# Patient Record
Sex: FEMALE | Race: BLACK OR AFRICAN AMERICAN
[De-identification: names, ages, dates, MRNs, and addresses within clinical notes are randomized per-mention and may not be internally consistent; named-entity substitution may affect disease eponyms.]

---

## 2017-05-10 NOTE — ER DOCUMENT REPORT
ED General





- General


Chief Complaint: Groin Pain


Stated Complaint: GROIN PAIN,LEFT SIDE TINGLING


Time Seen by Provider: 05/10/17 06:11


Mode of Arrival: Ambulatory


Information source: Patient


Notes: 


31-year-old female history of left groin pain 3 weeks ago which resolved on its 

own presents with complaints of continued left groin pain from last night.  

Patient denies any injuries states she awoke with this pain.  Denies any fevers 

or chills nausea vomiting or diarrhea.  Patient denies any urinary complaints 

or pelvic pain.  Patient notes pain worsens with ambulation and bending of her 

hip


TRAVEL OUTSIDE OF THE [place] IN LAST 30 DAYS: No





- HPI


Onset: Yesterday


Onset/Duration: Sudden


Quality of pain: Achy


Severity: Mild


Pain Level: 1


Associated symptoms: Body/muscle aches


Exacerbated by: Walking


Relieved by: Denies


Similar symptoms previously: Yes


Recently seen / treated by doctor: Yes





- Related Data


Allergies/Adverse Reactions: 


 





No Known Allergies Allergy (Verified 05/10/17 06:13)


 











Past Medical History





- Social History


Smoking Status: Never Smoker


Cigarette use (# per day): No


Chew tobacco use (# tins/day): No


Smoking Education Provided: No


Family History: Arthritis, CAD, DM, Hyperlipidemia, Hypertension, Malignancy, 

Thyroid Disfunction


Patient has suicidal ideation: No


Patient has homicidal ideation: No


Pulmonary Medical History: Reports: Hx Bronchitis


Neurological Medical History: Reports: Hx Migraine


Renal/ Medical History: Denies: Hx Peritoneal Dialysis


Musculoskeltal Medical History: Reports Hx Musculoskeletal Trauma


Psychiatric Medical History: Reports: Hx Attention Deficit Hyperactivity 

Disorder - add, Hx Depression


Traumatic Medical History: Reports: Hx Fractures - right wrist


Past Surgical History: Reports: Hx Gynecologic Surgery - leep, Hx Oral Surgery 

- wisdom teeth, Hx Tonsillectomy, Hx Tubal Ligation





- Immunizations


Immunizations up to date: No


Hx Diphtheria, Pertussis, Tetanus Vaccination: No





Review of Systems





- Review of Systems


Notes: 


REVIEW OF SYSTEMS:


CONSTITUTIONAL :  Denies fever,  chills, or sweats.  Denies recent illness.


EENT:   Denies eye, ear, throat, or mouth pain or symptoms.  Denies nasal or 

sinus congestion or discharge.  Denies throat, tongue, or mouth swelling or 

difficulty swallowing.


CARDIOVASCULAR:  Denies chest pain.  Denies palpitations or racing or irregular 

heart beat.  Denies ankle edema.


RESPIRATORY:  Denies cough, cold, or chest congestion.  Denies shortness of 

breath, difficulty breathing, or wheezing.


GASTROINTESTINAL:  Denies abdominal pain or distention.  Denies nausea, vomiting

, or diarrhea.  Denies blood in vomitus, stools, or per rectum.  Denies black, 

tarry stools.  Denies constipation. 


GENITOURINARY:  Denies difficulty urinating, painful urination, burning, 

frequency, blood in urine, or discharge.


FEMALE  GENITOURINARY:  Denies vaginal bleeding, heavy or abnormal periods, 

irregular periods.  Denies vaginal discharge or odor. 


MUSCULOSKELETAL: Left groin pain


SKIN:   Denies rash, lesions or sores.


HEMATOLOGIC :   Denies easy bruising or bleeding.


LYMPHATIC:  Denies swollen, enlarged glands.


NEUROLOGICAL:  Denies confusion or altered mental status.  Denies passing out 

or loss of consciousness.  Denies dizziness or lightheadedness.  Denies 

headache.  Denies weakness or paralysis or loss of use of either side.  Denies 

problems with gait or speech.  Denies sensory loss, numbness, or tingling.  

Denies seizures.


PSYCHIATRIC:  Denies anxiety or stress.  Denies depression, suicidal ideation, 

or homicidal ideation.





ALL OTHER SYSTEMS REVIEWED AND NEGATIVE.








Dictation was performed using Dragon voice recognition software 











PHYSICAL EXAMINATION:





GENERAL: Well-appearing, well-nourished and in no acute distress.





HEAD: Atraumatic, normocephalic.





EYES: Pupils equal round and reactive to light, extraocular movements intact, 

conjunctiva are normal.





ENT: Nares patent, oropharynx clear without exudates.  Moist mucous membranes.





NECK: Normal range of motion, supple without lymphadenopathy





LUNGS: Breath sounds clear to auscultation bilaterally and equal.  No wheezes 

rales or rhonchi.





HEART: Regular rate and rhythm without murmurs





ABDOMEN: Soft, nontender, nondistended abdomen.  No guarding, no rebound.  No 

masses appreciated.





Female : deferred





Musculoskeletal: Pain with range of motion of the left hip tender of the inner 

thigh with no inguinal involvement no adenopathy no bony tenderness





NEUROLOGICAL: Cranial nerves grossly intact.  Normal speech, normal gait.  

Normal sensory, motor exams





PSYCH: Normal mood, normal affect.





SKIN: Warm, Dry, normal turgor, no rashes or lesions noted.





Physical Exam





- Vital signs


Vitals: 





 











Temp Pulse Resp BP Pulse Ox


 


 98.7 F   101 H  18   133/93 H  100 


 


 05/10/17 06:14  05/10/17 06:14  05/10/17 06:14  05/10/17 06:14  05/10/17 06:14














Course





- Re-evaluation


Re-evalutation: 


05/10/17 06:48


Patient instructed on risks and benefits of medications prescribed. Denies any 

concerns regarding such. 





Patient will be treated with prednisone Valium and naproxen. 





Otherwise appears to be a groin strain in this well-appearing and stable for 

discharge








After performing a Medical Screening Examination, I estimate there is LOW risk 

for INTRACRANIAL HEMORRHAGE, UNSTABLE SPINE FRACTURE, CENTRAL CORD SYNDROME, 

CAUDA EQUINA, THORACIC AORTIC DISSECTION, PNEUMOTHORAX, PERFORATED BOWEL, 

RUPTURED ABDOMINAL AORTIC ANEURYSM, ACUTE TENDON RUPTURE, COMPARTMENT SYNDROME, 

or OPEN FRACTURE, thus I consider the discharge disposition reasonable. Also, 

there is no evidence or peritonitis, sepsis, or toxicity.  I have reevaluated 

this patient multiple times and no significant life threatening changes are 

noted. The patient and I have discussed the diagnosis and risks, and we agree 

with discharging home to follow-up with their primary doctor with the 

understanding that symptoms and presentations can change. We also discussed 

returning to the Emergency Department immediately if new or worsening symptoms 

occur. We have discussed the symptoms which are most concerning (e.g., bloody 

stool, fever, changing or worsening pain, vomiting) that necessitate immediate 

return.





05/10/17 06:49








- Vital Signs


Vital signs: 





 











Temp Pulse Resp BP Pulse Ox


 


 98.7 F   101 H  18   133/93 H  100 


 


 05/10/17 06:14  05/10/17 06:14  05/10/17 06:14  05/10/17 06:14  05/10/17 06:14














Discharge





- Discharge


Clinical Impression: 


Strain of left inguinal muscle


Qualifiers:


 Encounter type: initial encounter Qualified Code(s): S39.013A - Strain of 

muscle, fascia and tendon of pelvis, initial encounter





Condition: Stable


Disposition: HOME, SELF-CARE


Instructions:  Muscle Strain ([facility]), Myalagia (Muscle Pain) ([facility])


Additional Instructions: 





Please follow-up with the following clinic in 2-3 days or return immediately if 

there are any other concerns








[facility]





Medical [facility] in [place], [place]





Address: [address], [place] [address]





Phone: [phone]


Prescriptions: 


Diazepam [Valium 5 mg Tablet] 5 mg PO QIDP PRN #15 tablet


 PRN Reason: 


Naproxen 500 mg PO BID #30 tablet


Prednisone 60 mg PO DAILY #15 tablet
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
## 2018-04-20 ENCOUNTER — HOSPITAL ENCOUNTER (EMERGENCY)
Dept: HOSPITAL 62 - ER | Age: 33
Discharge: HOME | End: 2018-04-20
Payer: SELF-PAY

## 2018-04-20 VITALS — DIASTOLIC BLOOD PRESSURE: 88 MMHG | SYSTOLIC BLOOD PRESSURE: 148 MMHG

## 2018-04-20 DIAGNOSIS — M25.422: ICD-10-CM

## 2018-04-20 DIAGNOSIS — M25.522: Primary | ICD-10-CM

## 2018-04-20 DIAGNOSIS — F17.210: ICD-10-CM

## 2018-04-20 LAB
ADD MANUAL DIFF: NO
ALBUMIN SERPL-MCNC: 3.9 G/DL (ref 3.5–5)
ALP SERPL-CCNC: 107 U/L (ref 38–126)
ALT SERPL-CCNC: 25 U/L (ref 9–52)
ANION GAP SERPL CALC-SCNC: 12 MMOL/L (ref 5–19)
AST SERPL-CCNC: 16 U/L (ref 14–36)
BASOPHILS # BLD AUTO: 0 10^3/UL (ref 0–0.2)
BASOPHILS NFR BLD AUTO: 0.4 % (ref 0–2)
BILIRUB DIRECT SERPL-MCNC: 0.1 MG/DL (ref 0–0.4)
BILIRUB SERPL-MCNC: 0.1 MG/DL (ref 0.2–1.3)
BUN SERPL-MCNC: 17 MG/DL (ref 7–20)
CALCIUM: 9.5 MG/DL (ref 8.4–10.2)
CHLORIDE SERPL-SCNC: 106 MMOL/L (ref 98–107)
CO2 SERPL-SCNC: 24 MMOL/L (ref 22–30)
EOSINOPHIL # BLD AUTO: 0.1 10^3/UL (ref 0–0.6)
EOSINOPHIL NFR BLD AUTO: 0.9 % (ref 0–6)
ERYTHROCYTE [DISTWIDTH] IN BLOOD BY AUTOMATED COUNT: 13.1 % (ref 11.5–14)
ERYTHROCYTE [SEDIMENTATION RATE] IN BLOOD: 15 MM/HR (ref 0–20)
GLUCOSE SERPL-MCNC: 94 MG/DL (ref 75–110)
HCT VFR BLD CALC: 40.8 % (ref 36–47)
HGB BLD-MCNC: 14 G/DL (ref 12–15.5)
LYMPHOCYTES # BLD AUTO: 2.7 10^3/UL (ref 0.5–4.7)
LYMPHOCYTES NFR BLD AUTO: 29.7 % (ref 13–45)
MCH RBC QN AUTO: 29.8 PG (ref 27–33.4)
MCHC RBC AUTO-ENTMCNC: 34.2 G/DL (ref 32–36)
MCV RBC AUTO: 87 FL (ref 80–97)
MONOCYTES # BLD AUTO: 0.7 10^3/UL (ref 0.1–1.4)
MONOCYTES NFR BLD AUTO: 7.8 % (ref 3–13)
NEUTROPHILS # BLD AUTO: 5.6 10^3/UL (ref 1.7–8.2)
NEUTS SEG NFR BLD AUTO: 61.2 % (ref 42–78)
PLATELET # BLD: 270 10^3/UL (ref 150–450)
POTASSIUM SERPL-SCNC: 4.4 MMOL/L (ref 3.6–5)
PROT SERPL-MCNC: 7.3 G/DL (ref 6.3–8.2)
RBC # BLD AUTO: 4.68 10^6/UL (ref 3.72–5.28)
SODIUM SERPL-SCNC: 142.4 MMOL/L (ref 137–145)
TOTAL CELLS COUNTED % (AUTO): 100 %
WBC # BLD AUTO: 9.1 10^3/UL (ref 4–10.5)

## 2018-04-20 PROCEDURE — 85652 RBC SED RATE AUTOMATED: CPT

## 2018-04-20 PROCEDURE — 85025 COMPLETE CBC W/AUTO DIFF WBC: CPT

## 2018-04-20 PROCEDURE — 36415 COLL VENOUS BLD VENIPUNCTURE: CPT

## 2018-04-20 PROCEDURE — 99284 EMERGENCY DEPT VISIT MOD MDM: CPT

## 2018-04-20 PROCEDURE — 86140 C-REACTIVE PROTEIN: CPT

## 2018-04-20 PROCEDURE — 80053 COMPREHEN METABOLIC PANEL: CPT

## 2018-04-20 NOTE — RADIOLOGY REPORT (SQ)
EXAM DESCRIPTION:  ELBOW LEFT OVER 2 VIEWS



COMPLETED DATE/TIME:  4/20/2018 3:49 pm



REASON FOR STUDY:  pain and swelling



COMPARISON:  None.



NUMBER OF VIEWS:  Four view.



TECHNIQUE:  AP, lateral, and both oblique radiographic images acquired of the left elbow.



LIMITATIONS:  None.



FINDINGS:  MINERALIZATION: Normal.

BONES: No acute fracture or dislocation. No worrisome bone lesions. No significant osteophytes.

JOINT: No effusions.

SOFT TISSUES: No soft tissue swelling.  No foreign body.

OTHER: No other significant finding.



IMPRESSION:  NEGATIVE STUDY OF THE LEFT ELBOW.  NO EXPLANATION FOR PAIN.



TECHNICAL DOCUMENTATION:  JOB ID:  [id number]

 2011 [employer]- All Rights Reserved.



Reading location - IP/workstation name: [email or web address]
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
## 2019-08-20 LAB
ANION GAP SERPL CALC-SCNC: 8 MMOL/L (ref 5–19)
APPEARANCE UR: CLEAR
APTT PPP: YELLOW S
BILIRUB UR QL STRIP: NEGATIVE
BUN SERPL-MCNC: 11 MG/DL (ref 7–20)
CALCIUM: 9.2 MG/DL (ref 8.4–10.2)
CHLORIDE SERPL-SCNC: 103 MMOL/L (ref 98–107)
CO2 SERPL-SCNC: 27 MMOL/L (ref 22–30)
ERYTHROCYTE [DISTWIDTH] IN BLOOD BY AUTOMATED COUNT: 13.1 % (ref 11.5–14)
GLUCOSE SERPL-MCNC: 90 MG/DL (ref 75–110)
GLUCOSE UR STRIP-MCNC: NEGATIVE MG/DL
HCT VFR BLD CALC: 41.8 % (ref 36–47)
HGB BLD-MCNC: 14.4 G/DL (ref 12–15.5)
KETONES UR STRIP-MCNC: NEGATIVE MG/DL
MCH RBC QN AUTO: 29.9 PG (ref 27–33.4)
MCHC RBC AUTO-ENTMCNC: 34.4 G/DL (ref 32–36)
MCV RBC AUTO: 87 FL (ref 80–97)
NITRITE UR QL STRIP: NEGATIVE
PH UR STRIP: 6 [PH] (ref 5–9)
PLATELET # BLD: 266 10^3/UL (ref 150–450)
POTASSIUM SERPL-SCNC: 4.2 MMOL/L (ref 3.6–5)
PROT UR STRIP-MCNC: NEGATIVE MG/DL
RBC # BLD AUTO: 4.8 10^6/UL (ref 3.72–5.28)
SP GR UR STRIP: 1.02
UROBILINOGEN UR-MCNC: NEGATIVE MG/DL (ref ?–2)
WBC # BLD AUTO: 8.4 10^3/UL (ref 4–10.5)

## 2019-08-27 ENCOUNTER — HOSPITAL ENCOUNTER (OUTPATIENT)
Dept: HOSPITAL 62 - OROUT | Age: 34
Discharge: HOME | End: 2019-08-27
Attending: SPECIALIST
Payer: COMMERCIAL

## 2019-08-27 VITALS — SYSTOLIC BLOOD PRESSURE: 136 MMHG | DIASTOLIC BLOOD PRESSURE: 85 MMHG

## 2019-08-27 DIAGNOSIS — N92.0: ICD-10-CM

## 2019-08-27 DIAGNOSIS — N94.4: Primary | ICD-10-CM

## 2019-08-27 DIAGNOSIS — F17.210: ICD-10-CM

## 2019-08-27 PROCEDURE — 58563 HYSTEROSCOPY ABLATION: CPT

## 2019-08-27 PROCEDURE — 80048 BASIC METABOLIC PNL TOTAL CA: CPT

## 2019-08-27 PROCEDURE — 86900 BLOOD TYPING SEROLOGIC ABO: CPT

## 2019-08-27 PROCEDURE — 36415 COLL VENOUS BLD VENIPUNCTURE: CPT

## 2019-08-27 PROCEDURE — 85027 COMPLETE CBC AUTOMATED: CPT

## 2019-08-27 PROCEDURE — 86901 BLOOD TYPING SEROLOGIC RH(D): CPT

## 2019-08-27 PROCEDURE — 81001 URINALYSIS AUTO W/SCOPE: CPT

## 2019-08-27 PROCEDURE — 86850 RBC ANTIBODY SCREEN: CPT

## 2019-08-27 PROCEDURE — 81025 URINE PREGNANCY TEST: CPT

## 2019-08-27 RX ADMIN — FENTANYL CITRATE ONE MCG: 50 INJECTION INTRAMUSCULAR; INTRAVENOUS at 08:25

## 2019-08-27 RX ADMIN — OXYCODONE AND ACETAMINOPHEN PRN TAB: 5; 325 TABLET ORAL at 09:38

## 2019-08-27 RX ADMIN — OXYCODONE AND ACETAMINOPHEN PRN TAB: 5; 325 TABLET ORAL at 08:45

## 2019-08-27 RX ADMIN — FENTANYL CITRATE ONE MCG: 50 INJECTION INTRAMUSCULAR; INTRAVENOUS at 08:17
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
## 2019-08-27 NOTE — DISCHARGE SUMMARY
Discharge Summary (SDC)





- Discharge


Final Diagnosis: 





Menorrhagia


Date of Surgery: 08/27/19


Discharge Date: 08/27/19


Condition: Good


Treatment or Instructions: 


None


Referrals: 


DANIAL VELIZ MD [EMERITUS] -  (1 week)


Discharge Diet: As Tolerated


Discharge Activity: Activity As Tolerated


Home Care Assistance: None Needed


Report the Following to Your Physician Immediately: Shortness of Breath, Nausea,

 Vomiting, Increase in Pain, Signs of Hyperglycemia, Signs of Hypoglycemia, 

Yellow Skin, Fever over 101 Degrees, Unusual Bleeding, Redness, Swelling, 

Warmth, Increased Soreness, Drainage-Yellow, Drainage-Gray, Drainage-Green, 

Drainage-Foul Smelling, Increased Vaginal Bleed, Large Clots, Numbness, Tingling

 Sensation, Visual Disturbance, Weight Gain 2-3lbs a day, Weight Gain 3-5lbs a 

week, Wheezing, Seizure, IV Site Infection Signs, Urinary Infection Signs
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
## 2019-10-29 ENCOUNTER — HOSPITAL ENCOUNTER (EMERGENCY)
Dept: HOSPITAL 62 - ER | Age: 34
Discharge: HOME | End: 2019-10-29
Payer: COMMERCIAL

## 2019-10-29 VITALS — SYSTOLIC BLOOD PRESSURE: 145 MMHG | DIASTOLIC BLOOD PRESSURE: 94 MMHG

## 2019-10-29 DIAGNOSIS — F17.200: ICD-10-CM

## 2019-10-29 DIAGNOSIS — Z98.51: ICD-10-CM

## 2019-10-29 DIAGNOSIS — R51: Primary | ICD-10-CM

## 2019-10-29 PROCEDURE — 96372 THER/PROPH/DIAG INJ SC/IM: CPT

## 2019-10-29 PROCEDURE — 99283 EMERGENCY DEPT VISIT LOW MDM: CPT

## 2019-10-29 NOTE — ER DOCUMENT REPORT
HPI





- HPI


Time Seen by Provider: 10/29/19 11:21


Pain Level: 4


Notes: 





Patient is a 34-year-old female with a history of migraines who presents 

complaining of ongoing migraine for the past 3 days that is been resilient 

despite her home medications.  Patient states that this is not the worst 

headache of her life and did not start as a thunderclap.  She is able to eat and

drink without difficulty.  She is urinating normally.  She has had some light 

sensitivity associated.  Patient states that the headache is primarily to the 

frontal area which is where her normal headaches are located.  Denies any fever,

head injury, neck pain, changes in vision/speech/mentation/hearing, URI, sore 

throat, chest pain, palpitations, syncope, cough, shortness of breath, wheeze, 

dyspnea, abdominal pain, nausea/vomiting/diarrhea, urinary retention, dysuria, 

hematuria, loss of control of bowel or bladder, numbness/tingling, saddle 

anesthesia, muscle paralysis/weakness, or rash.





- ROS


Systems Reviewed and Negative: Yes All other systems reviewed and negative





- CONSTITUTIONAL


Constitutional: DENIES: Fever, Chills





- REPRODUCTIVE


Reproductive: DENIES: Pregnant:





Past Medical History





- General


Information source: Patient





- Social History


Smoking Status: Current Every Day Smoker


Frequency of alcohol use: Occasional


Drug Abuse: None


Family History: Arthritis, CAD, DM, Hyperlipidemia, Hypertension, Malignancy, 

Thyroid Disfunction


Patient has suicidal ideation: No


Patient has homicidal ideation: No





- Past Medical History


Cardiac Medical History: 


   Denies: Hx Coronary Artery Disease, Hx Heart Attack, Hx Hypertension


Pulmonary Medical History: 


   Denies: Hx Asthma, Hx Bronchitis, Hx COPD, Hx Pneumonia


Neurological Medical History: Reports: Hx Migraine.  Denies: Hx Cerebrovascular 

Accident, Hx Seizures


Renal/ Medical History: Denies: Hx Peritoneal Dialysis


Musculoskeletal Medical History: Denies Hx Arthritis, Reports Hx Musculoskeletal

Trauma


Psychiatric Medical History: Reports: Hx Attention Deficit Hyperactivity 

Disorder - add, Hx Depression


Traumatic Medical History: Reports: Hx Fractures - right wrist


Past Surgical History: Reports: Hx Gynecologic Surgery - leep, Hx Oral Surgery -

wisdom teeth, Hx Tonsillectomy, Hx Tubal Ligation





- Immunizations


Immunizations up to date: No


Hx Diphtheria, Pertussis, Tetanus Vaccination: No





Vertical Provider Document





- CONSTITUTIONAL


Agree With Documented VS: Yes


Notes: 





PHYSICAL EXAMINATION:  





GENERAL: Well-appearing, well-nourished and in no acute distress.  A&Ox4.  

Answers questions appropriately.





HEAD: Atraumatic, normocephalic.  Non-tender. 





EYES: Pupils equal round and reactive to light, extraocular movements intact, 

sclera anicteric, conjunctiva are normal.  No nystagmus.  





ENT: Nares patent and without discharge.  oropharynx clear without exudates.  No

tonsilar hypertrophy or erythema.  Moist mucous membranes.  





NECK: Normal range of motion, supple without lymphadenopathy.  No rigidity/me

ningismus.  No midline tenderness. 





LUNGS: Breath sounds clear to auscultation bilaterally and equal.  No wheezes 

rales or rhonchi.





HEART: Regular rate and rhythm without murmurs, rubs, gallops.





ABDOMEN: Soft, nontender, nondistended abdomen.  No guarding, no rebound.  

Normal bowel sounds present.  No CVA tenderness bilaterally.  





Musculoskeletal: Ext b/l:  FROM to passive/active. Strength 5+/5.  No deficits 

noted.  No bony tenderness of extremities.





Extremities:  No cyanosis, clubbing, or edema b/l.  Peripheral pulses 2+.  

Capillary refill less than 2 seconds.





NEUROLOGICAL: NIH 0.  GCS 15.  Cranial nerves grossly intact.  Normal speech, 

normal gait.  Normal sensory, motor exams.  Reflexes 2+ b/l. KADEN's negative.  

Pronator drift negative. Heel/shin, finger/nose wnl. Romberg neg.





PSYCH: Normal mood, normal affect.





SKIN: Warm, Dry, normal turgor, no rashes or lesions noted.





- INFECTION CONTROL


TRAVEL OUTSIDE OF THE [place] IN LAST 30 DAYS: No





Course





- Re-evaluation


Re-evalutation: 





10/29/19 12:31


Patient is an afebrile, well-hydrated, 34-year-old female who presents to the ED

with a headache, suspect migraine.  Vitals are acceptable without any 

significant tachycardia, tachypnea, or hypoxia.  PE is otherwise unremarkable 

for any focal neurological deficits.  NIH 0, GCS 15, cranial nerves grossly 

intact.  Patient has had headaches like this in the past recently.  No labs or 

imaging warranted at this time based on H&P.  Patient was given Toradol, reglan,

and benadryl which has resolved her headache.  Patient states that she is 

feeling much better and would like to go home.  She is nontoxic-appearing and is

tolerating p.o. without any difficulties.  Low suspicion for any acute glaucoma,

temporal arteritis, meningitis, intracranial hemorrhage, ischemic stroke, or 

fracture at this time.  Patient is aware that this condition can change from 

initial presentation and that she needs to monitor symptoms closely for any 

acute changes.  Recheck with your PCM/neurologist in 3-5 days.  Return to the ED

with any worsening/concerning symptoms otherwise as reviewed in discharge.  

Patient is in agreement.





- Vital Signs


Vital signs: 


                                        











Temp Pulse Resp BP Pulse Ox


 


 98.2 F   91   16   145/94 H  99 


 


 10/29/19 11:16  10/29/19 11:16  10/29/19 11:16  10/29/19 11:16  10/29/19 11:16














Discharge





- Discharge


Clinical Impression: 


Headache


Qualifiers:


 Headache type: unspecified Headache chronicity pattern: acute headache 

Intractability: not intractable Qualified Code(s): R51 - Headache





Condition: Stable


Disposition: HOME, SELF-CARE


Instructions:  Headache ([facility])


Additional Instructions: 


Rest, Ice/cool compress


Tylenol/ibuprofen as needed


Light stretches daily


Strength exercises as able


Moist heat and massage may help


F/u with your PCP in 3-5 days for a recheck


Consider consult(s) with Neurology for ongoing/worsening symptoms





Return to the ED with any worsening symptoms and/or development of fever, 

headache, changes in behavior/mentation/vision/speech, chest pain, palpitations,

syncope, shortness of breath, trouble breathing, abdominal pain, n/v/d, blood in

stool/urine, loss of control of bowel/bladder, urinary retention, muscle 

weakness/paralysis, saddle anesthesia, numbness/tingling, or other worsening 

symptoms that are concerning to you.


Forms:  Elevated Blood Pressure


Referrals: 


RADHA MARTINEZ MD [[facility]] - Follow up as needed


GWEN FONTENOT MD [NO LOCAL MD] - Follow up as needed

## 2019-10-29 NOTE — ER DOCUMENT REPORT
ED Medical Screen (RME)





- General


Chief Complaint: Headache


Stated Complaint: HEADACHE


Time Seen by Provider: 10/29/19 11:21


Primary Care Provider: 


RADHA MARTINEZ MD [Primary Care Provider] - Follow up as needed


Mode of Arrival: Ambulatory


Information source: Patient


Notes: 





This 34-year-old female with history of migraines presents emergency department 

with a frontal and occipital headache for the past 3 days.  Reports she has 

taken her usual migraine medications without relief of symptoms.  She went to 

her primary care provider today [facility], but they were so busy she came 

here.  Denies fever vomiting diarrhea.  Complains her left arm feels numb 

tingling.  Reports her grandmother had a stroke.  Denies trauma.  Speaking in a 

clear voice no obvious neuro deficits noted.  Reports the back of her head feels

heavy and the top front of her head feels pressure.  Reports usually migraine is

behind her eyes.








I have greeted and performed a rapid initial assessment of this patient.  A 

comprehensive ED assessment and evaluation of the patient, analysis of test 

results and completion of the medical decision making process will be conducted 

by additional ED providers.


Dictation of this chart was performed using voice recognition software; 

therefore, there may be some unintended grammatical errors.














TRAVEL OUTSIDE OF THE [place] IN LAST 30 DAYS: No





- Related Data


Allergies/Adverse Reactions: 


                                        





No Known Allergies Allergy (Verified 08/27/19 05:58)


   








Home Medications: Maxalt PRN for migraines, Fioricet PRN for migranes, and 

Motrin PRN for migraines





Past Medical History





- Social History


Frequency of alcohol use: Occasional


Drug Abuse: None





- Past Medical History


Cardiac Medical History: 


   Denies: Hx Coronary Artery Disease, Hx Heart Attack, Hx Hypertension


Pulmonary Medical History: 


   Denies: Hx Asthma, Hx Bronchitis, Hx COPD, Hx Pneumonia


Neurological Medical History: Reports: Hx Migraine.  Denies: Hx Cerebrovascular 

Accident, Hx Seizures


Renal/ Medical History: Denies: Hx Peritoneal Dialysis


Musculoskeltal Medical History: Denies Hx Arthritis, Reports Hx Musculoskeletal 

Trauma


Psychiatric Medical History: Reports: Hx Attention Deficit Hyperactivity 

Disorder - add, Hx Depression


Traumatic Medical History: Reports: Hx Fractures - right wrist


Past Surgical History: Reports: Hx Gynecologic Surgery - leep, Hx Oral Surgery -

wisdom teeth, Hx Tonsillectomy, Hx Tubal Ligation





- Immunizations


Immunizations up to date: No


Hx Diphtheria, Pertussis, Tetanus Vaccination: No





Physical Exam





- Vital signs


Vitals: 





                                        











Temp Pulse Resp BP Pulse Ox


 


 98.2 F   91   16   145/94 H  99 


 


 10/29/19 11:16  10/29/19 11:16  10/29/19 11:16  10/29/19 11:16  10/29/19 11:16














Course





- Vital Signs


Vital signs: 





                                        











Temp Pulse Resp BP Pulse Ox


 


 98.2 F   91   16   145/94 H  99 


 


 10/29/19 11:16  10/29/19 11:16  10/29/19 11:16  10/29/19 11:16  10/29/19 11:16














Doctor's Discharge





- Discharge


Referrals: 


RADHA MARTINEZ MD [Primary Care Provider] - Follow up as needed

## 2019-11-27 ENCOUNTER — HOSPITAL ENCOUNTER (OUTPATIENT)
Dept: HOSPITAL 62 - OD | Age: 34
End: 2019-11-27
Attending: NURSE PRACTITIONER
Payer: COMMERCIAL

## 2019-11-27 DIAGNOSIS — Z79.899: Primary | ICD-10-CM

## 2019-11-27 LAB
ADD MANUAL DIFF: NO
ALBUMIN SERPL-MCNC: 4.2 G/DL (ref 3.5–5)
ALP SERPL-CCNC: 111 U/L (ref 38–126)
ANION GAP SERPL CALC-SCNC: 10 MMOL/L (ref 5–19)
AST SERPL-CCNC: 19 U/L (ref 14–36)
BASOPHILS # BLD AUTO: 0 10^3/UL (ref 0–0.2)
BASOPHILS NFR BLD AUTO: 0.1 % (ref 0–2)
BILIRUB DIRECT SERPL-MCNC: 0.1 MG/DL (ref 0–0.4)
BILIRUB SERPL-MCNC: 0.5 MG/DL (ref 0.2–1.3)
BUN SERPL-MCNC: 8 MG/DL (ref 7–20)
CALCIUM: 9.3 MG/DL (ref 8.4–10.2)
CHLORIDE SERPL-SCNC: 108 MMOL/L (ref 98–107)
CHOLEST SERPL-MCNC: 212.33 MG/DL (ref 0–200)
CO2 SERPL-SCNC: 22 MMOL/L (ref 22–30)
EOSINOPHIL # BLD AUTO: 0 10^3/UL (ref 0–0.6)
EOSINOPHIL NFR BLD AUTO: 0.2 % (ref 0–6)
ERYTHROCYTE [DISTWIDTH] IN BLOOD BY AUTOMATED COUNT: 13.3 % (ref 11.5–14)
GLUCOSE SERPL-MCNC: 90 MG/DL (ref 75–110)
HCT VFR BLD CALC: 42.8 % (ref 36–47)
HGB BLD-MCNC: 14.8 G/DL (ref 12–15.5)
LDLC SERPL DIRECT ASSAY-MCNC: 152 MG/DL (ref ?–100)
LYMPHOCYTES # BLD AUTO: 2.3 10^3/UL (ref 0.5–4.7)
LYMPHOCYTES NFR BLD AUTO: 17.2 % (ref 13–45)
MCH RBC QN AUTO: 30.4 PG (ref 27–33.4)
MCHC RBC AUTO-ENTMCNC: 34.6 G/DL (ref 32–36)
MCV RBC AUTO: 88 FL (ref 80–97)
MONOCYTES # BLD AUTO: 1 10^3/UL (ref 0.1–1.4)
MONOCYTES NFR BLD AUTO: 7.5 % (ref 3–13)
NEUTROPHILS # BLD AUTO: 10.1 10^3/UL (ref 1.7–8.2)
NEUTS SEG NFR BLD AUTO: 75 % (ref 42–78)
PLATELET # BLD: 255 10^3/UL (ref 150–450)
POTASSIUM SERPL-SCNC: 4.1 MMOL/L (ref 3.6–5)
PROT SERPL-MCNC: 7.6 G/DL (ref 6.3–8.2)
RBC # BLD AUTO: 4.87 10^6/UL (ref 3.72–5.28)
TOTAL CELLS COUNTED % (AUTO): 100 %
TRIGL SERPL-MCNC: 157 MG/DL (ref ?–150)
VLDLC SERPL CALC-MCNC: 31.4 MG/DL (ref 10–31)
WBC # BLD AUTO: 13.4 10^3/UL (ref 4–10.5)

## 2019-11-27 PROCEDURE — 80053 COMPREHEN METABOLIC PANEL: CPT

## 2019-11-27 PROCEDURE — 84443 ASSAY THYROID STIM HORMONE: CPT

## 2019-11-27 PROCEDURE — 36415 COLL VENOUS BLD VENIPUNCTURE: CPT

## 2019-11-27 PROCEDURE — 80061 LIPID PANEL: CPT

## 2019-11-27 PROCEDURE — 85025 COMPLETE CBC W/AUTO DIFF WBC: CPT
